# Patient Record
Sex: FEMALE | Race: WHITE | HISPANIC OR LATINO | Employment: FULL TIME | URBAN - METROPOLITAN AREA
[De-identification: names, ages, dates, MRNs, and addresses within clinical notes are randomized per-mention and may not be internally consistent; named-entity substitution may affect disease eponyms.]

---

## 2022-10-25 ENCOUNTER — PRE-ADMISSION TESTING (OUTPATIENT)
Dept: ADMISSIONS | Facility: MEDICAL CENTER | Age: 47
End: 2022-10-25
Attending: OPHTHALMOLOGY
Payer: COMMERCIAL

## 2022-10-25 ENCOUNTER — ANESTHESIA EVENT (OUTPATIENT)
Dept: SURGERY | Facility: MEDICAL CENTER | Age: 47
End: 2022-10-25
Payer: COMMERCIAL

## 2022-10-25 DIAGNOSIS — Z01.812 PRE-OPERATIVE LABORATORY EXAMINATION: ICD-10-CM

## 2022-10-25 LAB
ERYTHROCYTE [DISTWIDTH] IN BLOOD BY AUTOMATED COUNT: 38.6 FL (ref 35.9–50)
HCT VFR BLD AUTO: 43 % (ref 37–47)
HGB BLD-MCNC: 15 G/DL (ref 12–16)
MCH RBC QN AUTO: 31.4 PG (ref 27–33)
MCHC RBC AUTO-ENTMCNC: 34.9 G/DL (ref 33.6–35)
MCV RBC AUTO: 90 FL (ref 81.4–97.8)
PLATELET # BLD AUTO: 187 K/UL (ref 164–446)
PMV BLD AUTO: 11 FL (ref 9–12.9)
RBC # BLD AUTO: 4.78 M/UL (ref 4.2–5.4)
WBC # BLD AUTO: 7.1 K/UL (ref 4.8–10.8)

## 2022-10-25 PROCEDURE — 85027 COMPLETE CBC AUTOMATED: CPT

## 2022-10-25 PROCEDURE — 36415 COLL VENOUS BLD VENIPUNCTURE: CPT

## 2022-10-25 RX ORDER — LEVOTHYROXINE SODIUM 0.07 MG/1
75 TABLET ORAL
COMMUNITY
Start: 2022-10-07

## 2022-10-26 ENCOUNTER — ANESTHESIA (OUTPATIENT)
Dept: SURGERY | Facility: MEDICAL CENTER | Age: 47
End: 2022-10-26
Payer: COMMERCIAL

## 2022-10-26 ENCOUNTER — HOSPITAL ENCOUNTER (OUTPATIENT)
Facility: MEDICAL CENTER | Age: 47
End: 2022-10-26
Attending: OPHTHALMOLOGY | Admitting: OPHTHALMOLOGY
Payer: COMMERCIAL

## 2022-10-26 VITALS
RESPIRATION RATE: 18 BRPM | TEMPERATURE: 97.3 F | SYSTOLIC BLOOD PRESSURE: 107 MMHG | WEIGHT: 132.06 LBS | HEART RATE: 68 BPM | BODY MASS INDEX: 23.4 KG/M2 | DIASTOLIC BLOOD PRESSURE: 66 MMHG | HEIGHT: 63 IN | OXYGEN SATURATION: 95 %

## 2022-10-26 PROCEDURE — 700111 HCHG RX REV CODE 636 W/ 250 OVERRIDE (IP): Performed by: OPHTHALMOLOGY

## 2022-10-26 PROCEDURE — 160046 HCHG PACU - 1ST 60 MINS PHASE II: Performed by: OPHTHALMOLOGY

## 2022-10-26 PROCEDURE — 160009 HCHG ANES TIME/MIN: Performed by: OPHTHALMOLOGY

## 2022-10-26 PROCEDURE — A9270 NON-COVERED ITEM OR SERVICE: HCPCS | Performed by: ANESTHESIOLOGY

## 2022-10-26 PROCEDURE — 700105 HCHG RX REV CODE 258: Performed by: OPHTHALMOLOGY

## 2022-10-26 PROCEDURE — 700101 HCHG RX REV CODE 250: Performed by: ANESTHESIOLOGY

## 2022-10-26 PROCEDURE — 160002 HCHG RECOVERY MINUTES (STAT): Performed by: OPHTHALMOLOGY

## 2022-10-26 PROCEDURE — 160035 HCHG PACU - 1ST 60 MINS PHASE I: Performed by: OPHTHALMOLOGY

## 2022-10-26 PROCEDURE — 700101 HCHG RX REV CODE 250: Performed by: OPHTHALMOLOGY

## 2022-10-26 PROCEDURE — 160025 RECOVERY II MINUTES (STATS): Performed by: OPHTHALMOLOGY

## 2022-10-26 PROCEDURE — 700102 HCHG RX REV CODE 250 W/ 637 OVERRIDE(OP): Performed by: ANESTHESIOLOGY

## 2022-10-26 PROCEDURE — 160029 HCHG SURGERY MINUTES - 1ST 30 MINS LEVEL 4: Performed by: OPHTHALMOLOGY

## 2022-10-26 PROCEDURE — 700111 HCHG RX REV CODE 636 W/ 250 OVERRIDE (IP): Performed by: ANESTHESIOLOGY

## 2022-10-26 PROCEDURE — 160048 HCHG OR STATISTICAL LEVEL 1-5: Performed by: OPHTHALMOLOGY

## 2022-10-26 PROCEDURE — 700101 HCHG RX REV CODE 250

## 2022-10-26 PROCEDURE — 160041 HCHG SURGERY MINUTES - EA ADDL 1 MIN LEVEL 4: Performed by: OPHTHALMOLOGY

## 2022-10-26 PROCEDURE — 00140 ANES PROCEDURES ON EYE NOS: CPT | Performed by: ANESTHESIOLOGY

## 2022-10-26 RX ORDER — SODIUM CHLORIDE, SODIUM LACTATE, POTASSIUM CHLORIDE, CALCIUM CHLORIDE 600; 310; 30; 20 MG/100ML; MG/100ML; MG/100ML; MG/100ML
INJECTION, SOLUTION INTRAVENOUS CONTINUOUS
Status: DISCONTINUED | OUTPATIENT
Start: 2022-10-26 | End: 2022-10-26 | Stop reason: HOSPADM

## 2022-10-26 RX ORDER — TETRACAINE HYDROCHLORIDE 5 MG/ML
SOLUTION OPHTHALMIC
Status: COMPLETED
Start: 2022-10-26 | End: 2022-10-26

## 2022-10-26 RX ORDER — DIPHENHYDRAMINE HYDROCHLORIDE 50 MG/ML
12.5 INJECTION INTRAMUSCULAR; INTRAVENOUS
Status: DISCONTINUED | OUTPATIENT
Start: 2022-10-26 | End: 2022-10-26 | Stop reason: HOSPADM

## 2022-10-26 RX ORDER — MIDAZOLAM HYDROCHLORIDE 1 MG/ML
INJECTION INTRAMUSCULAR; INTRAVENOUS PRN
Status: DISCONTINUED | OUTPATIENT
Start: 2022-10-26 | End: 2022-10-26 | Stop reason: SURG

## 2022-10-26 RX ORDER — OXYCODONE HCL 5 MG/5 ML
5 SOLUTION, ORAL ORAL
Status: DISCONTINUED | OUTPATIENT
Start: 2022-10-26 | End: 2022-10-26 | Stop reason: HOSPADM

## 2022-10-26 RX ORDER — OXYCODONE HCL 5 MG/5 ML
10 SOLUTION, ORAL ORAL
Status: DISCONTINUED | OUTPATIENT
Start: 2022-10-26 | End: 2022-10-26 | Stop reason: HOSPADM

## 2022-10-26 RX ORDER — EPINEPHRINE 1 MG/ML(1)
AMPUL (ML) INJECTION
Status: DISCONTINUED
Start: 2022-10-26 | End: 2022-10-26 | Stop reason: HOSPADM

## 2022-10-26 RX ORDER — LIDOCAINE HYDROCHLORIDE 20 MG/ML
INJECTION, SOLUTION EPIDURAL; INFILTRATION; INTRACAUDAL; PERINEURAL PRN
Status: DISCONTINUED | OUTPATIENT
Start: 2022-10-26 | End: 2022-10-26 | Stop reason: SURG

## 2022-10-26 RX ORDER — ONDANSETRON 2 MG/ML
INJECTION INTRAMUSCULAR; INTRAVENOUS PRN
Status: DISCONTINUED | OUTPATIENT
Start: 2022-10-26 | End: 2022-10-26 | Stop reason: SURG

## 2022-10-26 RX ORDER — PHENYLEPHRINE HYDROCHLORIDE 25 MG/ML
SOLUTION/ DROPS OPHTHALMIC
Status: COMPLETED
Start: 2022-10-26 | End: 2022-10-26

## 2022-10-26 RX ORDER — ONDANSETRON 2 MG/ML
4 INJECTION INTRAMUSCULAR; INTRAVENOUS
Status: DISCONTINUED | OUTPATIENT
Start: 2022-10-26 | End: 2022-10-26 | Stop reason: HOSPADM

## 2022-10-26 RX ORDER — MEPERIDINE HYDROCHLORIDE 25 MG/ML
6.25 INJECTION INTRAMUSCULAR; INTRAVENOUS; SUBCUTANEOUS
Status: DISCONTINUED | OUTPATIENT
Start: 2022-10-26 | End: 2022-10-26 | Stop reason: HOSPADM

## 2022-10-26 RX ORDER — ACETAMINOPHEN 500 MG
1000 TABLET ORAL ONCE
Status: COMPLETED | OUTPATIENT
Start: 2022-10-26 | End: 2022-10-26

## 2022-10-26 RX ORDER — DEXAMETHASONE SODIUM PHOSPHATE 4 MG/ML
INJECTION, SOLUTION INTRA-ARTICULAR; INTRALESIONAL; INTRAMUSCULAR; INTRAVENOUS; SOFT TISSUE
Status: DISCONTINUED | OUTPATIENT
Start: 2022-10-26 | End: 2022-10-26 | Stop reason: HOSPADM

## 2022-10-26 RX ORDER — LIDOCAINE HCL/EPINEPHRINE/PF 2%-1:200K
VIAL (ML) INJECTION
Status: DISCONTINUED | OUTPATIENT
Start: 2022-10-26 | End: 2022-10-26 | Stop reason: HOSPADM

## 2022-10-26 RX ORDER — CEFAZOLIN SODIUM 1 G/3ML
INJECTION, POWDER, FOR SOLUTION INTRAMUSCULAR; INTRAVENOUS
Status: DISCONTINUED | OUTPATIENT
Start: 2022-10-26 | End: 2022-10-26 | Stop reason: HOSPADM

## 2022-10-26 RX ORDER — DEXAMETHASONE SODIUM PHOSPHATE 4 MG/ML
INJECTION, SOLUTION INTRA-ARTICULAR; INTRALESIONAL; INTRAMUSCULAR; INTRAVENOUS; SOFT TISSUE PRN
Status: DISCONTINUED | OUTPATIENT
Start: 2022-10-26 | End: 2022-10-26 | Stop reason: SURG

## 2022-10-26 RX ORDER — CELECOXIB 200 MG/1
200 CAPSULE ORAL ONCE
Status: COMPLETED | OUTPATIENT
Start: 2022-10-26 | End: 2022-10-26

## 2022-10-26 RX ORDER — HALOPERIDOL 5 MG/ML
1 INJECTION INTRAMUSCULAR
Status: DISCONTINUED | OUTPATIENT
Start: 2022-10-26 | End: 2022-10-26 | Stop reason: HOSPADM

## 2022-10-26 RX ADMIN — TETRACAINE HYDROCHLORIDE 1 DROP: 5 SOLUTION OPHTHALMIC at 06:35

## 2022-10-26 RX ADMIN — DEXAMETHASONE SODIUM PHOSPHATE 8 MG: 4 INJECTION, SOLUTION INTRA-ARTICULAR; INTRALESIONAL; INTRAMUSCULAR; INTRAVENOUS; SOFT TISSUE at 07:52

## 2022-10-26 RX ADMIN — MIDAZOLAM HYDROCHLORIDE 1 MG: 1 INJECTION, SOLUTION INTRAMUSCULAR; INTRAVENOUS at 07:35

## 2022-10-26 RX ADMIN — FENTANYL CITRATE 25 MCG: 50 INJECTION, SOLUTION INTRAMUSCULAR; INTRAVENOUS at 07:35

## 2022-10-26 RX ADMIN — ACETAMINOPHEN 1000 MG: 500 TABLET ORAL at 06:20

## 2022-10-26 RX ADMIN — FENTANYL CITRATE 25 MCG: 50 INJECTION, SOLUTION INTRAMUSCULAR; INTRAVENOUS at 07:45

## 2022-10-26 RX ADMIN — CELECOXIB 200 MG: 200 CAPSULE ORAL at 06:20

## 2022-10-26 RX ADMIN — ONDANSETRON 4 MG: 2 INJECTION INTRAMUSCULAR; INTRAVENOUS at 07:52

## 2022-10-26 RX ADMIN — PHENYLEPHRINE HYDROCHLORIDE 1 DROP: 25 SOLUTION/ DROPS OPHTHALMIC at 06:20

## 2022-10-26 RX ADMIN — SODIUM CHLORIDE, POTASSIUM CHLORIDE, SODIUM LACTATE AND CALCIUM CHLORIDE 1000 ML: 600; 310; 30; 20 INJECTION, SOLUTION INTRAVENOUS at 06:35

## 2022-10-26 RX ADMIN — LIDOCAINE HYDROCHLORIDE 60 MG: 20 INJECTION, SOLUTION EPIDURAL; INFILTRATION; INTRACAUDAL at 07:35

## 2022-10-26 RX ADMIN — PROPOFOL 150 MG: 10 INJECTION, EMULSION INTRAVENOUS at 07:35

## 2022-10-26 ASSESSMENT — PAIN DESCRIPTION - PAIN TYPE
TYPE: SURGICAL PAIN

## 2022-10-26 ASSESSMENT — PAIN SCALES - GENERAL: PAIN_LEVEL: 0

## 2022-10-26 NOTE — ANESTHESIA PREPROCEDURE EVALUATION
Case: [id number] Date/Time: 10/26/22 0715    Procedure: PTERYGIUM EXCISION WITH AUTOGRAFT NASAL RIGHT EYE    Pre-op diagnosis: PTERYGIURE    Location: [facility] ROOM 24 / SURGERY SAME DAY [facility]    Surgeons: Yogi Chacon M.D.          Relevant Problems   No relevant active problems       Physical Exam    Airway   Mallampati: II  TM distance: >3 FB  Neck ROM: full       Cardiovascular   Rhythm: regular  Rate: normal     Dental - normal exam           Pulmonary    Abdominal - normal exam     Neurological              Anesthesia Plan    ASA 2       Plan - general       Airway plan will be LMA          Induction: intravenous      Pertinent diagnostic labs and testing reviewed    Informed Consent:    Anesthetic plan and risks discussed with patient.    Use of blood products discussed with: whom consented to blood products.

## 2022-10-26 NOTE — ANESTHESIA POSTPROCEDURE EVALUATION
Patient: Ariella Farr Jasso    Procedure Summary     Date: 10/26/22 Room / Location: [facility] ROOM 24 / SURGERY SAME DAY [facility]    Anesthesia Start: 0732 Anesthesia Stop: 0824    Procedure: PTERYGIUM EXCISION WITH AUTOGRAFT NASAL RIGHT EYE (Right: Eye) Diagnosis: (PTERYGIURE)    Surgeons: Yogi Chacon M.D. Responsible Provider: Zulema Pace M.D.    Anesthesia Type: general ASA Status: 2          Final Anesthesia Type: general  Last vitals  BP   Blood Pressure: 107/66    Temp   36.3 °C (97.3 °F)    Pulse   68   Resp   18    SpO2   95 %      Anesthesia Post Evaluation    Patient location during evaluation: PACU  Patient participation: complete - patient participated  Level of consciousness: awake and alert  Pain score: 0    Airway patency: patent  Anesthetic complications: no  Cardiovascular status: adequate and hemodynamically stable  Respiratory status: acceptable and room air  Hydration status: acceptable    PONV: none          No notable events documented.     Nurse Pain Score: 0 (NPRS)

## 2022-10-26 NOTE — OP REPORT
Pterygium  MRN: [id number]  : 1975  Admit Date: 10/26/2022  Operative Date: 10/26/22  Patient: Ariella Jasso  Surgeon: Yogi Chacon M.D.  OPERATIVE REPORT   SURGEON Yogi Chacon M.D.  ANESTHESIA: General  PREOPERATIVE DIAGNOSIS: Right eye pterygium.   POSTOPERATIVE DIAGNOSIS: Right eye pterygium.   PROCEDURE:   Pterygium excision with autograft Right eye.  ASSISTANT: None.   COMPLICATION: None.   SPECIMEN:  None  Blood loss: Minimal  Indications for surgery: Progressive pterygium Right Eye  PROCEDURE   Following a review of informed consent, the patient was brought to the operative suite where IV sedation was induced. The eye was prepped and draped in the usual sterile fashion and a speculum was applied. Topical lidocaine 2% with epinephrine was applied to ocular surface.     A 6-0 silk traction suture was placed along the superior limbus and secured with mosquito clamp to allow exposure of the nasal pterygium and associated conjunctiva.   A marking pen was used to gilma the borders of the pterygium. 2% lidocaine with epinephrine was injected underneath the pterygium. Darinel scissors combined with blunt weckcell sponges or a muscle hook were used to resect the pterygium from its apex. Blunt Darinel scissors were used to remove the pterygium while it was carefully elevated to avoid the medial rectus. The underlying Tenon's capsule was bluntly dissected and excised.   Residual pterygium was carefully elevated from the corneal stroma with gentle traction and the crescent blade.   The resulting conjunctival defect was measured.    Attention was directed to the superior conjunctiva where calipers were used to gilma the donor site. Lidocaine with epinephrine was then used to create a plane between conjunctiva and Tenon's capsule superiorly.  The marked superior conjunctiva was then carefully dissected and removed. Care was made to maintain the limbal orientation of the tissue as it was moved  nasally. Fibrin glue was then used to secure the graft nasally. The graft was smoothed and the host tissue was pulled over the peripheral edges.      Subconjunctival Ancef and Dexamethasone were administered.    The speculum and drapes were removed. The eye was irrigated. Combination antibiotic and steroid ophthalmic ointment was instilled in the eye, which was then patched and shielded. The patient was brought to the recovery room in stable condition.

## 2022-10-26 NOTE — DISCHARGE INSTRUCTIONS
Keep eye patch on until seen with Dr. Chacon at your follow up appointment tomorrow. Continue eye drops as directed by Dr. Chacon.    Last pain medication:  Tylenol and Celebrex (like motrin) given at 6:20 am    Instrucciones Para [place]  (Home Care Instructions)    ACTIVIDAD: Descanse y tome todo con mucha calma las primeras 24 horas después de qureshi cirugía.  Lisbet persona adulta responsable debe permanecer con alfredo esteban janet periodo de tiempo.  Es normal sentirse sonoliento o sonolienta esteban esas primeras horas.  Le recomendamos que no paige nada que requiera equilibrio, kodi decisiones a mucha coordinación de qureshi parte.    NO PAIGE ESTO PURANTE LAS PRIMERAS 24 HORAS:   Manejar o conducir algún vehiculo, operar maquinarias o utilizar electrodomesticos.   Beber cerveza o algún otro tipo de bebida alcohólica.   Kodi decisiones importantes o firmar documentos legales.      DIETA: Para evitar las nauseas, prosiga despacito con qureshi dieta a medida que pueda ir tolerándola mejor, evite comidas muy condimentadas o grasosas esteban janet primer día.  Vaya agregando comidas más substanciadas a qureshi dieta a medida que asi lo indique qureshi médica.  Los bebés pueden beber leche preparada o formula, ásl jason también leche del seno de la madre a medida que vayan teniendo hambre.  SIGA AGREGANDO LIQUIDOS Y COMIDAS CON FIBRA PARA EVITAR ESTREÑIMIENTO.    JASON BAÑARSE Y CAMBIAR LOS VENDAJES DE LA CIRUGIA: Tomorrow  after Dr. mandeep Pfeiffer debe LIAMAR A QURESHI MEDICO si tiene los siguientes síntomas:   -   Lisbet fiebre más aubrey de 101 grados Fahrenheit.   -   Un dolor incesante aún con los medicamentos, o nauseas y vómito persistente.   -   Un sangrado excesivo (lexi que traspasa los vendajes o gasas) o algúln tipo de drenaje inesperado que proviene de la henda.     -   Un color vega exagerado o hinchazón alrededor del área en donde se le hizo incisión o weston, o un drenaje de pus o con olor sahil proveniente de la henda.   -    La  inhabilidad de orinar o vaciar barnes vejiga en 8 horas.   -    Problemas con a respiración o gini en el pecho.    Usted debe llamar al 911 si se presentan problemas con el dolor al respirar o el pecho.  Si no se puede ponnoer en comunicación con un medica o con el centro de cirugía, usted debe ir a la estación de emergencia (emergency room) más cercana o a un centro de atención de urgencia (urgent care center).  El teléfono del medico es: [phone]    LOS SÍNTOMAS DE UN LEVE RESFRIO SON MUY NORMALES.  ADEMÁS USTED PUEDE LLEGAR A SENTIR GINI GENERALES DE MÚSCULOS, IRRITACIÓN EN LA GARGANTA, GINI DE MINOO Y/O UN POCO DE NAUSEAS.    Sie tiene alguna pregunta, llame a barnes médico.  Si barnes médico no se encuentra disponible, por favor llame al Centro de Cirugía at [phone].  el Centro está abierto de Lunes a Viernes desde las 7:00 de la manana hasta las 5:00 de la noche.      Mi firma a continuación indica que he recibido y entiendco estas instrucciones acera de los cuidados en la casa (Home Care Instructions)    Usleón recibirá serafin encuesta en la correspondencia en las siguientes semanas y le pedimos que por favor tome un momento para completar marco encuesta y regresaría a hosotros.  Nuestro objetivó es brindarle un cuidado muy mendez y par lo tanto apreciamos marni coméntanos.  Muchas phuc por nasima escogido el [facility].

## 2022-10-26 NOTE — OR NURSING
0821 received patient from OR. Report from Anesthesiologist and OR RN. Patient on 4L at 99 % O2. VSS. Monitor connected. No airway in place. S/P right eye pterygium, incision on right eye, covered with dressing CDI.     0837 patient awake, complains of no pain or nausea.    0900 discharge instructions covered with Son. Verbalizes understanding. All questions answered at this time. Patient is phase II appropriate.      0906 Patient escorted out to responsible adult via wheelchair by RN. Belongings with patient, ambulated with steady gait. Discharge paperwork and instructions reviewed, signed, and sent with patient. IV removed.

## 2022-10-26 NOTE — ANESTHESIA PROCEDURE NOTES
Airway    Date/Time: 10/26/2022 7:40 AM  Performed by: Zulema Pace M.D.  Authorized by: Zulema Pace M.D.     Location:  OR  Urgency:  Elective  Difficult Airway: No    Indications for Airway Management:  Anesthesia      Spontaneous Ventilation: present    Sedation Level:  Deep  Preoxygenated: Yes    Patient Position:  Sniffing  MILS Maintained Throughout: No    Mask Difficulty Assessment:  0 - not attempted  Final Airway Type:  Supraglottic airway  Final Supraglottic Airway:  Standard LMA    SGA Size:  3  Number of Attempts at Approach:  1

## (undated) DEVICE — SLEEVE VASO CALF MED - (10PR/CA)

## (undated) DEVICE — SPEAR EYE SPNG 3ANG MLBL HNDL - (10/ST18ST/PK 180/PK 1PK/SP)

## (undated) DEVICE — CANNULA INJECTION 27G (EYE) - 10/BX ALCON

## (undated) DEVICE — GLOVE BIOGEL SZ 7.5 SURGICAL PF LTX - (50PR/BX 4BX/CA)

## (undated) DEVICE — SHIELD OPTH AL GRTR CVR FOX (50EA/BX)

## (undated) DEVICE — PEN SKIN MARKER W/RULER - (50EA/BX)

## (undated) DEVICE — WATER IRRIGATION STERILE 1000ML (12EA/CA)

## (undated) DEVICE — GOWN SURGICAL X-LARGE ULTRA - FILM-REINFORCED (20/CA)

## (undated) DEVICE — TOWEL STOP TIMEOUT SAFETY FLAG (40EA/CA)

## (undated) DEVICE — CANISTER SUCTION RIGID RED 1500CC (40EA/CA)

## (undated) DEVICE — SYRINGE NON SAFETY 3 CC 21 GA X 1 1/2 IN (100/BX 8BX/CA)

## (undated) DEVICE — PACK CATARACT GENERAL (4EA/CA)

## (undated) DEVICE — GOWN WARMING STANDARD FLEX - (30/CA)

## (undated) DEVICE — KIT  I.V. START (100EA/CA)

## (undated) DEVICE — CANNULA W/ SUPPLY TUBING O2 - (50/CA)

## (undated) DEVICE — CANISTER SUCTION 3000ML MECHANICAL FILTER AUTO SHUTOFF MEDI-VAC NONSTERILE LF DISP  (40EA/CA)

## (undated) DEVICE — SODIUM CHL IRRIGATION 0.9% 1000ML (12EA/CA)

## (undated) DEVICE — SUTURE 6-0 SILK  P-1 (12PK/BX)

## (undated) DEVICE — SUTURE GENERAL

## (undated) DEVICE — TISSEEL 2ML

## (undated) DEVICE — LACTATED RINGERS INJ 1000 ML - (14EA/CA 60CA/PF)

## (undated) DEVICE — TUBE CONNECTING SUCTION - CLEAR PLASTIC STERILE 72 IN (50EA/CA)

## (undated) DEVICE — SENSOR OXIMETER ADULT SPO2 RD SET (20EA/BX)

## (undated) DEVICE — SET LEADWIRE 5 LEAD BEDSIDE DISPOSABLE ECG (1SET OF 5/EA)

## (undated) DEVICE — TUBING CLEARLINK DUO-VENT - C-FLO (48EA/CA)

## (undated) DEVICE — KNIFE CRESCENT ANGLED 55DEG - EYE (10/SP)

## (undated) DEVICE — MASK AIRWAY SIZE 3 UNIQUE SILICON (10/BX)

## (undated) DEVICE — NEEDLE  NON-SAFETY 30GA X 3/4 IN (10EA/CA)

## (undated) DEVICE — SUCTION INSTRUMENT YANKAUER BULBOUS TIP W/O VENT (50EA/CA)

## (undated) DEVICE — MASK OXYGEN VNYL ADLT MED CONC WITH 7 FOOT TUBING  - (50EA/CA)